# Patient Record
(demographics unavailable — no encounter records)

---

## 2017-04-04 NOTE — ER DOCUMENT REPORT
HPI





- HPI


Patient complains to provider of: UPPER LEFT BACK PAIN


Onset: Other


Onset/Duration: Gradual, Intermittent


Quality of pain: Burning


Severity: Moderate


Pain Level: 4


Context: 


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  Has been 

having left upper back pain that is now causing tingling and numbness down her 

upper left arm.  Feels like something is pinching.  Symptoms started initially 

on March 25 after catering a wedding.  Denies chest pain or shortness of breath.


Associated Symptoms: None


Exacerbated by: Movement


Relieved by: Remaining still


Similar symptoms previously: Yes


Recently seen / treated by doctor: No





- ROS


ROS below otherwise negative: Yes


Systems Reviewed and Negative: Yes All other systems reviewed and negative





- CONSTITUTIONAL


Constitutional: DENIES: Fever





- EENT


EENT: DENIES: Congestion





- NEURO


Neurology: DENIES: Headache





- CARDIOVASCULAR


Cardiovascular: DENIES: Chest pain





- RESPIRATORY


Respiratory: DENIES: Trouble Breathing





- GASTROINTESTINAL


Gastrointestinal: DENIES: Abdominal Pain





- URINARY


Urinary: DENIES: Dysuria





- REPRODUCTIVE


Reproductive: DENIES: Pregnant:





- MUSCULOSKELETAL


Musculoskeletal: REPORTS: Back Pain


Notes: 


UPPER LEFT BACK AND ACROSS LEFT SHOULDER





- DERM


Skin Color: Normal





Past Medical History





- General


Information source: Patient





- Social History


Smoking Status: Never Smoker


Frequency of alcohol use: Occasional


Drug Abuse: None


Lives with: Family


Family History: DM, Hyperlipidemia, Hypertension, Malignancy, Thyroid 

Disfunction, Other - cushings, pituitary tumor.


Patient has suicidal ideation: No


Patient has homicidal ideation: No


Pulmonary Medical History: Reports: Hx Asthma


   Denies: Hx Tuberculosis


Endocrine Medical History: Reports: Hx Hypothyroidism


Renal/ Medical History: Denies: Hx Peritoneal Dialysis


Musculoskeltal Medical History: Reports Hx Arthritis, Reports Hx 

Musculoskeletal Deformity


Skin Medical History: Reports Hx Cellulitis - breast abscess


Past Surgical History: Reports: Hx Cholecystectomy, Hx Orthopedic Surgery - R 

hip scheduled Oct 9 2014, Hx Thyroid Surgery - thyroidectomy.  Denies: Hx 

Pacemaker





- Immunizations


Immunizations up to date: No


Hx Diphtheria, Pertussis, Tetanus Vaccination: No





Vertical Provider Document





- CONSTITUTIONAL


Agree With Documented VS: Yes


Exam Limitations: No Limitations, Clinical Condition


General Appearance: WD/WN, No Apparent Distress





- INFECTION CONTROL


TRAVEL OUTSIDE OF THE [place] IN LAST 30 DAYS: No





- HEENT


HEENT: Atraumatic, Normocephalic





- NECK


Neck: Normal Inspection, Supple





- RESPIRATORY


Respiratory: Breath Sounds Normal, No Respiratory Distress


O2 Sat by Pulse Oximetry: 98





- CARDIOVASCULAR


Cardiovascular: Regular Rate, Regular Rhythm





- GI/ABDOMEN


Gastrointestinal: Abdomen Soft





- BACK


Notes: 


Patient tender just left of the upper thoracic spine to under her left scapula.

  Pain reproduced with deep palpation, and flexion of head towards chest.  

Patient has equal  bilaterally.  Neurovascular and sensation intact.





- MUSCULOSKELETAL/EXTREMETIES


Musculoskeletal/Extremeties: MAEW, FROM





- NEURO


Level of Consciousness: Awake, Alert, Appropriate





- DERM


Integumentary: Warm, Dry, No Rash





Course





- Vital Signs


Vital signs: 


 











Temp Pulse Resp BP Pulse Ox


 


 97.9 F   94   16   119/86 H  98 


 


 04/04/17 11:07  04/04/17 11:07  04/04/17 11:07  04/04/17 11:07  04/04/17 11:07














Discharge





- Discharge


Clinical Impression: 


 Upper back pain on left side, Paresthesia and pain of left extremity





Condition: Good


Disposition: HOME, SELF-CARE


Instructions:  Pain Medication Injection ([facility]), Ice Packs ([facility]), Warm Packs ([facility]

), Muscle Strain ([facility])


Additional Instructions: 


MEDS AS PRESCRIBED


NO HEAVY LIFTING!!!


ICE OR HEAT TO AREA


FOLLOW UP WITH YOUR PCP IF NOT BETTER ONE WEEK, EARLIER IF WORSENS


RETURN AS NEEDED


Prescriptions: 


Cyclobenzaprine HCl [Flexeril 5 mg Tablet] 5 mg PO TID #15 tablet


Prednisone [Deltasone 10 mg Tablet] 10 mg PO ASDIR PRN #21 tablet


 PRN Reason: 


Tramadol HCl 50 mg PO PRN PRN #15 tablet


 PRN Reason:

## 2017-06-12 NOTE — ER DOCUMENT REPORT
HPI





- HPI


Patient complains to provider of: cut finger wine glass broke


Onset: This morning


Onset/Duration: Sudden


Pain Level: 1


Context: 


35-year-old female cut her fourth left finger while washing a wine glass and 

the stem broke.  Tetanus not current.


Associated Symptoms: None


Exacerbated by: Denies


Relieved by: Denies


Similar symptoms previously: No


Recently seen / treated by doctor: No





- ROS


ROS below otherwise negative: Yes


Systems Reviewed and Negative: Yes All other systems reviewed and negative





- REPRODUCTIVE


LMP: 6/8/17


Reproductive: DENIES: Pregnant:





- DERM


Skin Color: Normal





Past Medical History





- General


Information source: Patient





- Social History


Smoking Status: Never Smoker


Frequency of alcohol use: None


Drug Abuse: None


Lives with: Family


Family History: DM, Hyperlipidemia, Hypertension, Malignancy, Thyroid 

Disfunction, Other - cushings, pituitary tumor.


Pulmonary Medical History: Reports: Hx Asthma


   Denies: Hx Tuberculosis


Endocrine Medical History: Reports: Hx Hypothyroidism


Renal/ Medical History: Denies: Hx Peritoneal Dialysis


Musculoskeltal Medical History: Reports Hx Arthritis, Reports Hx 

Musculoskeletal Deformity


Skin Medical History: Reports Hx Cellulitis - breast abscess


Past Surgical History: Reports: Hx Cholecystectomy, Hx Orthopedic Surgery - R 

hip scheduled Oct 9 2014, Hx Thyroid Surgery - thyroidectomy.  Denies: Hx 

Pacemaker





- Immunizations


Immunizations up to date: No


Hx Diphtheria, Pertussis, Tetanus Vaccination: No





Vertical Provider Document





- CONSTITUTIONAL


Agree With Documented VS: Yes


Exam Limitations: No Limitations


General Appearance: No Apparent Distress





- INFECTION CONTROL


TRAVEL OUTSIDE OF THE [place] IN LAST 30 DAYS: No





- HEENT


HEENT: Normocephalic





- NECK


Neck: Supple





- RESPIRATORY


O2 Sat by Pulse Oximetry: 98





- MUSCULOSKELETAL/EXTREMETIES


Musculoskeletal/Extremeties: MAEW, FROM, Tender - 1 cm partial-thickness cut 

mid left finger.  She washed her finger in the sink with soap and water, dried, 

bacitracin and Tegaderm applied so that she can remove the ring finger.





- NEURO


Level of Consciousness: Awake, Alert


Motor/Sensory: No Motor Deficit, No Sensory Deficit





- DERM


Integumentary: Laceration - See above





Course





- Vital Signs


Vital signs: 


 











Temp Pulse Resp BP Pulse Ox


 


 98.3 F   91   18   149/91 H  98 


 


 06/12/17 11:43  06/12/17 11:43  06/12/17 11:43  06/12/17 11:43  06/12/17 11:43














Discharge





- Discharge


Clinical Impression: 


 Superficial fourth left finger cut





Condition: Good


Disposition: HOME, SELF-CARE


Instructions:  Abrasions ([facility]), Antibiotic Ointment Protection ([facility])


Additional Instructions: 


use bacitracin and tegaderm for dressing


to er any signs of red, hot, put





Please complete the patient satisfaction survey if you get one, and return it.. 

If you do not receive a survey,  then you can go to the [facility] website, [email or web address] 

and place your comments about your very good care. Thank you very much. It was 

a pleasure being your medical provider today.


Referrals: 


FRANKI MORENO, DO [Primary Care Provider] - Follow up as needed